# Patient Record
Sex: MALE | Race: WHITE | NOT HISPANIC OR LATINO | URBAN - METROPOLITAN AREA
[De-identification: names, ages, dates, MRNs, and addresses within clinical notes are randomized per-mention and may not be internally consistent; named-entity substitution may affect disease eponyms.]

---

## 2023-04-08 ENCOUNTER — EMERGENCY (EMERGENCY)
Facility: HOSPITAL | Age: 43
LOS: 1 days | Discharge: HOME | End: 2023-04-08
Attending: EMERGENCY MEDICINE | Admitting: EMERGENCY MEDICINE
Payer: COMMERCIAL

## 2023-04-08 VITALS
RESPIRATION RATE: 18 BRPM | SYSTOLIC BLOOD PRESSURE: 138 MMHG | HEART RATE: 100 BPM | DIASTOLIC BLOOD PRESSURE: 83 MMHG | HEIGHT: 68 IN | TEMPERATURE: 98 F | OXYGEN SATURATION: 99 % | WEIGHT: 147.05 LBS

## 2023-04-08 PROCEDURE — 99283 EMERGENCY DEPT VISIT LOW MDM: CPT | Mod: 25

## 2023-04-08 PROCEDURE — 12001 RPR S/N/AX/GEN/TRNK 2.5CM/<: CPT

## 2023-04-08 PROCEDURE — 90715 TDAP VACCINE 7 YRS/> IM: CPT

## 2023-04-08 PROCEDURE — 90471 IMMUNIZATION ADMIN: CPT

## 2023-04-08 PROCEDURE — 12001 RPR S/N/AX/GEN/TRNK 2.5CM/<: CPT | Mod: F3

## 2023-04-08 RX ORDER — TETANUS TOXOID, REDUCED DIPHTHERIA TOXOID AND ACELLULAR PERTUSSIS VACCINE, ADSORBED 5; 2.5; 8; 8; 2.5 [IU]/.5ML; [IU]/.5ML; UG/.5ML; UG/.5ML; UG/.5ML
0.5 SUSPENSION INTRAMUSCULAR ONCE
Refills: 0 | Status: COMPLETED | OUTPATIENT
Start: 2023-04-08 | End: 2023-04-08

## 2023-04-08 RX ADMIN — TETANUS TOXOID, REDUCED DIPHTHERIA TOXOID AND ACELLULAR PERTUSSIS VACCINE, ADSORBED 0.5 MILLILITER(S): 5; 2.5; 8; 8; 2.5 SUSPENSION INTRAMUSCULAR at 12:38

## 2023-04-08 NOTE — ED PROVIDER NOTE - CARE PROVIDER_API CALL
Louis Laboy (MD)  Plastic Surgery  [address] [place] [address], [address]  [place], [place] [id number]  Phone: [phone]  Fax: [phone]  Follow Up Time:

## 2023-04-08 NOTE — ED PROVIDER NOTE - NSFOLLOWUPINSTRUCTIONS_ED_ALL_ED_FT
Keep wound dry x 24 hours then gently cleanse, apply bacitracin.  Do not submerge hand under water.  Tylenol, ibuprofen if needed for pain.  Elevate hand.  Return in 10 days for suture removal, sooner for worsening or concerning symptoms.          A laceration is a cut that may go through all layers of the skin. The cut may also go into the tissue that is right under the skin. Some cuts heal on their own. Other cuts need to be closed with stitches (sutures), staples, skin adhesive strips, or skin glue. Taking care of your cut lowers your risk of infection, helps your injury heal better, and may prevent scarring.    General tips  Keep your wound clean and dry.  Do not scratch or pick at your wound.  Wash your hands with soap and water for at least 20 seconds before and after touching your wound or changing your bandage (dressing). If you cannot use soap and water, use hand .  Do not usedisinfectants or antiseptics, such as rubbing alcohol, to clean your wound unless told by your doctor.  If you were given a bandage, change it at least once a day, or as told by your doctor. You should also change it if it gets wet or dirty.  How to take care of your cut  If your doctor used stitches or staples:    Keep the wound fully dry for the first 24 hours, or as told by your doctor. After that, you may take a shower or a bath. Do not soak the wound in water until after the stitches or staples have been taken out.  Clean the wound once a day, or as told by your doctor. To do this:  Wash the wound with soap and water.  Rinse the wound with water to remove all soap.  Pat the wound dry with a clean towel. Do not rub the wound.  After you clean the wound, put a thin layer of antibiotic ointment, another ointment, or a nonstick bandage on it as told by your doctor. This will help to:  Prevent infection.  Keep the bandage from sticking to the wound.  Have your stitches or staples taken out as told by your doctor.  If your doctor used skin adhesive strips:    Do not get the skin adhesive strips wet. You can take a shower or a bath, but keep the wound dry.  If the wound gets wet, pat it dry with a clean towel. Do not rub the wound.  Skin adhesive strips fall off on their own. You can trim the strips as the wound heals. Do not take off any strips that are still stuck to the wound unless told by your doctor. The strips will fall off after a while.  If your doctor used skin glue:    You may take a shower or a bath, but try to keep the wound dry. Do not soak the wound in water.  After you take a shower or a bath, pat the wound dry with a clean towel. Do not rub the wound.  Do not do any activities that will make you sweat a lot until the skin glue has fallen off.  Do not apply liquid, cream, or ointment medicine to your wound while the skin glue is still on.  If a bandage is placed over the wound, do not put tape right on top of the skin glue.  Do not pick at the glue. The skin glue usually stays on for 5–10 days. Then, it falls off the skin.  Follow these instructions at home:  Medicines    Take over-the-counter and prescription medicines only as told by your doctor.  If you were prescribed an antibiotic medicine, take or apply it as told by your doctor. Do not stop using it even if you start to feel better.  Managing pain and swelling    If told, put ice on the injured area. To do this:  Put ice in a plastic bag.  Place a towel between your skin and the bag.  Leave the ice on for 20 minutes, 2–3 times a day.  Take off the ice if your skin turns bright red. This is very important. If you cannot feel pain, heat, or cold, you have a greater risk of damage to the area.  Raise the injured area above the level of your heart while you are sitting or lying down.  General instructions    Two wounds closed with skin glue. One is normal. The other is red with pus and infected.  Avoid any activity that could make your wound reopen.  Check your wound every day for signs of infection. Check for:  More redness, swelling, or pain.  Fluid or blood.  Warmth.  Pus or a bad smell.  Keep all follow-up visits.  Contact a doctor if:  You got a tetanus shot and you have any of these problems where the needle went in:  Swelling.  Very bad pain.  Redness.  Bleeding.  A wound that was closed breaks open.  You have a fever.  You have any of these signs of infection in your wound:  More redness, swelling, or pain.  Fluid or blood.  Warmth.  Pus or a bad smell.  You see something coming out of the wound, such as wood or glass.  Medicine does not make your pain go away.  You notice a change in the color of your skin near your wound.  You need to change the bandage often.  You have a new rash.  You lose feeling (have numbness) around the wound.  Get help right away if:  You have very bad swelling around the wound.  Your pain suddenly gets worse and is very bad.  You have painful lumps near the wound or on skin anywhere on your body.  You have a red streak going away from your wound.  The wound is on your hand or foot, and:  You cannot move a finger or toe.  Your fingers or toes look pale or bluish.  Summary  A laceration is a cut that may go through all layers of the skin. The cut may also go into the tissue right under the skin.  Some cuts heal on their own. Others need to be closed with stitches, staples, skin adhesive strips, or skin glue.  Follow your doctor's instructions for caring for your cut. Proper care of a cut lowers the risk of infection, helps the cut heal better, and may prevent scarring.  This information is not intended to replace advice given to you by your health care provider. Make sure you discuss any questions you have with your health care provider.

## 2023-04-08 NOTE — ED PROVIDER NOTE - OBJECTIVE STATEMENT
42 M RHD presents with laceration to left middle finger sustained while using a pocket knife. At first stated utd with tetanus, then stated unsure, would like to get it today. Denies injuries elsewhere. Denies numbness, weakness.

## 2023-04-08 NOTE — ED PROCEDURE NOTE - CPROC ED POST PROC CARE GUIDE1
bacitracin, nonadhesive dressing, splint/Verbal/written post procedure instructions were given to patient/caregiver./Instructed patient/caregiver to follow-up with primary care physician./Instructed patient/caregiver regarding signs and symptoms of infection./Keep the cast/splint/dressing clean and dry.

## 2023-04-08 NOTE — ED ADULT TRIAGE NOTE - CHIEF COMPLAINT QUOTE
" I accidentally  cut my left 3rd finger a very sharp knife " Pt wound bleeding controlled with pressure dressing Pt UTD with tetanus vaccine

## 2023-04-08 NOTE — ED PROVIDER NOTE - MUSCULOSKELETAL, MLM
Left hand/fingers FROM throughout including middle finger, with strength 5/5, sensation intact, cap refill <2sec.

## 2023-04-08 NOTE — ED ADULT NURSE NOTE - OBJECTIVE STATEMENT
Pt came in for left 3rd finger laceration. Pt reported that he accidentally cut hi finger with a sharp utility knfe today. Pt wound bleeding controlled with pressure dressing

## 2023-04-08 NOTE — ED PROVIDER NOTE - CLINICAL SUMMARY MEDICAL DECISION MAKING FREE TEXT BOX
Patient is a 42-year-old male who presents to the emergency room with a chief complaint of laceration.  Patient with no significant past medical history unsure of date of last tetanus.  Reports that he is right-hand dominant.  Patient states that he accidentally sustained a laceration to his left middle finger while using a pocket knife.  Denies any additional injury.  Denies any bony tenderness.  Reports full range of motion of the digit.  Denies any numbness or tingling to the extremity.  On exam patient sitting up in bed no acute distress left hand third digit: 1.5 cm laceration noted to the palmar aspect overlying the PIP joint.  No evidence of foreign body.  No bony tenderness.  Full range of motion at the MCP PIP and DIP joint.  Sensation grossly intact cap refill less than 2 seconds positive radial pulse.  Patient presenting to the emergency room with a chief complaint of finger laceration.  Neurovascular intact no evidence of foreign body or bony tenderness.  Will update tetanus clean and repair laceration and discharge home.

## 2023-04-08 NOTE — ED PROVIDER NOTE - PATIENT PORTAL LINK FT
You can access the FollowMyHealth Patient Portal offered by [facility] by registering at the following website: [email or web address]. By joining [employer]’s FollowMyHealth portal, you will also be able to view your health information using other applications (apps) compatible with our system.

## 2023-04-21 ENCOUNTER — EMERGENCY (EMERGENCY)
Facility: HOSPITAL | Age: 43
LOS: 1 days | Discharge: HOME | End: 2023-04-21
Attending: EMERGENCY MEDICINE | Admitting: EMERGENCY MEDICINE
Payer: COMMERCIAL

## 2023-04-21 VITALS
HEART RATE: 68 BPM | HEIGHT: 68 IN | DIASTOLIC BLOOD PRESSURE: 76 MMHG | RESPIRATION RATE: 16 BRPM | OXYGEN SATURATION: 98 % | SYSTOLIC BLOOD PRESSURE: 115 MMHG | TEMPERATURE: 98 F | WEIGHT: 151.9 LBS

## 2023-04-21 PROCEDURE — G0463: CPT

## 2023-04-21 PROCEDURE — 15853 REMOVAL SUTR/STAPL XREQ ANES: CPT

## 2023-04-21 PROCEDURE — L9995: CPT

## 2023-04-21 NOTE — ED PROVIDER NOTE - SKIN, MLM
+3 intact sutures noted to palmar aspect of left middle finger PIP with FROM, no swelling/erythema/discharge noted, distal sensation intact, NVI +healing laceration with 3 intact sutures noted to palmar aspect of left middle finger PIP with FROM, no swelling/erythema/discharge noted, distal sensation intact, NVI

## 2023-04-21 NOTE — ED PROVIDER NOTE - NSFOLLOWUPINSTRUCTIONS_ED_ALL_ED_FT
Suture Removal, Care After  The following information offers guidance on how to care for yourself after your procedure. Your health care provider may also give you more specific instructions. If you have problems or questions, contact your health care provider.    What can I expect after the procedure?  After your stitches (sutures) are removed, it is common to have:  Some discomfort and swelling in the area.  Slight redness in the area.  Follow these instructions at home:  If you have a dressing:    Wash your hands with soap and water for at least 20 seconds before and after you change your bandage (dressing). If soap and water are not available, use hand .  Change your dressing as told by your health care provider. If your dressing becomes wet or dirty, or develops a bad smell, change it as soon as possible.  If your dressing sticks to your skin, pour warm, clean water over it until it loosens and can be removed without pulling apart the wound edges. Pat the area dry with a soft, clean towel. Do not rub the wound because that may cause bleeding.  Wound care    Two stitched wounds. One is normal. The other is red with pus and infected.  Check your wound every day for signs of infection. Check for:  More redness, swelling, or pain.  Fluid or blood.  New warmth, a rash, or hardness at the wound site.  Pus or a bad smell.  Wash your hands with soap and water for at least 20 seconds before and after touching your wound. If soap and water are not available, use hand .  Keep the wound area dry and clean. Clean and pat the wound dry as told by your health care provider.  Apply cream or ointment only as told by your health care provider.  If skin glue or adhesive strips were applied after sutures were removed, leave these closures in place. They may need to stay in place for 2 weeks or longer. If adhesive strip edges start to loosen and curl up, you may trim the loose edges. Do not remove adhesive strips completely unless your health care provider tells you to do that.  Continue to protect the wound from injury.  Do not pick at your wound. Picking can cause an infection.  Bathing    Do not take baths, swim, or use a hot tub until your health care provider approves. Ask your health care provider if you may take showers.  Follow these steps for showering:  If you have a dressing, remove it before getting into the shower.  In the shower, allow soapy water to get on the wound. Avoid scrubbing the wound.  When you get out of the shower, dry the wound by patting it with a clean towel.  Reapply a dressing over the wound, if needed.  Scar care    When your wound has completely healed, help decrease the size of your scar by:  Wearing sunscreen over the scar or covering it with clothing when you are outside. New scars get sunburned easily, which can make scarring worse.  Gently massaging the scarred area. This can decrease scar thickness.  General instructions    Take over-the-counter and prescription medicines only as told by your health care provider.  Keep all follow-up visits. This is important.  Contact a health care provider if:  You have more redness, swelling, or pain around your wound.  You have fluid or blood coming from your wound.  You have new warmth, a rash, or hardness at the wound site.  You have pus or a bad smell coming from your wound.  Your wound opens up.  Get help right away if:  You have a fever or chills.  You have red streaks coming from your wound.  Summary  After your sutures are removed, it is common to have some discomfort and swelling in the area.  Wash your hands with soap and water before you change your bandage (dressing).  Keep the wound area dry and clean. Do not take baths, swim, or use a hot tub until your health care provider approves.  This information is not intended to replace advice given to you by your health care provider. Make sure you discuss any questions you have with your health care provider.

## 2023-04-21 NOTE — ED PROVIDER NOTE - PATIENT PORTAL LINK FT
You can access the FollowMyHealth Patient Portal offered by [facility] by registering at the following website: [email or web address]. By joining [employer]’s FollowMyHealth portal, you will also be able to view your health information using other applications (apps) compatible with our system.

## 2023-04-21 NOTE — ED PROVIDER NOTE - ATTESTATION, MLM
<-- Click to add NO pertinent Family History I have reviewed and confirmed nurses' notes for patient's medications, allergies, medical history, and surgical history.

## 2023-04-21 NOTE — ED PROVIDER NOTE - OBJECTIVE STATEMENT
42-year-old male with presents for suture removal from left middle finger today.  Patient states that he sustained laceration to left middle finger on 4/8 from pocket knife and was seen in ED for laceration repair.  Tetanus was updated.  Denies redness, fever or other symptoms.

## 2023-04-21 NOTE — ED PROVIDER NOTE - CLINICAL SUMMARY MEDICAL DECISION MAKING FREE TEXT BOX
42-year-old male had sutures placed in left third finger for laceration last week presents for suture removal no complication.  Physical exam vital signs stable afebrile healing lack left third finger over palmar aspect of PIP joint.  No infection.  Full range of motion intact.  Plan is suture removal and wound care instructions.

## 2023-08-15 NOTE — ED PROVIDER NOTE - CARE PLAN
[facility] and confirmed that patient does have an active referral that would have covered the 7/24/23 visit with Dr. Nevaeh Prasad. The billing is still out to insurance. Malini Hickman and spoke to Jerrell Antunez. [facility]#[id number]  No current referral authorizations on file at [facility].     Message sent to Jimmy Hartley in [facility] 1 Principal Discharge DX:	Encounter for removal of sutures

## 2023-08-15 NOTE — ED PROVIDER NOTE - CCCP TRG CHIEF CMPLNT
suture/staple removal Propranolol Counseling:  I discussed with the patient the risks of propranolol including but not limited to low heart rate, low blood pressure, low blood sugar, restlessness and increased cold sensitivity. They should call the office if they experience any of these side effects.